# Patient Record
Sex: FEMALE
[De-identification: names, ages, dates, MRNs, and addresses within clinical notes are randomized per-mention and may not be internally consistent; named-entity substitution may affect disease eponyms.]

---

## 2022-09-14 ENCOUNTER — DASHBOARD ENCOUNTERS (OUTPATIENT)
Age: 32
End: 2022-09-14

## 2022-09-14 ENCOUNTER — LAB OUTSIDE AN ENCOUNTER (OUTPATIENT)
Dept: URBAN - METROPOLITAN AREA CLINIC 72 | Facility: CLINIC | Age: 32
End: 2022-09-14

## 2022-09-14 ENCOUNTER — WEB ENCOUNTER (OUTPATIENT)
Dept: URBAN - METROPOLITAN AREA CLINIC 72 | Facility: CLINIC | Age: 32
End: 2022-09-14

## 2022-09-14 ENCOUNTER — CLAIMS CREATED FROM THE CLAIM WINDOW (OUTPATIENT)
Dept: URBAN - METROPOLITAN AREA CLINIC 72 | Facility: CLINIC | Age: 32
End: 2022-09-14
Payer: MEDICAID

## 2022-09-14 VITALS
HEART RATE: 84 BPM | SYSTOLIC BLOOD PRESSURE: 129 MMHG | HEIGHT: 64 IN | BODY MASS INDEX: 25.61 KG/M2 | TEMPERATURE: 97.7 F | WEIGHT: 150 LBS | DIASTOLIC BLOOD PRESSURE: 87 MMHG

## 2022-09-14 DIAGNOSIS — K62.5 RECTAL BLEEDING: ICD-10-CM

## 2022-09-14 DIAGNOSIS — K64.8 INTERNAL HEMORRHOID: ICD-10-CM

## 2022-09-14 PROCEDURE — 99204 OFFICE O/P NEW MOD 45 MIN: CPT | Performed by: INTERNAL MEDICINE

## 2022-09-14 PROCEDURE — 46600 DIAGNOSTIC ANOSCOPY SPX: CPT | Performed by: INTERNAL MEDICINE

## 2022-09-14 RX ORDER — AMLODIPINE BESYLATE 10 MG/1
1 TABLET TABLET ORAL ONCE A DAY
Status: ACTIVE | COMMUNITY

## 2022-09-14 RX ORDER — HYDROCORTISONE ACETATE 25 MG/1
1 SUPPOSITORY SUPPOSITORY RECTAL ONCE A DAY
Qty: 30 | Refills: 1 | OUTPATIENT
Start: 2022-09-14 | End: 2022-11-12

## 2022-09-14 NOTE — HPI-TODAY'S VISIT:
Ms. Dee is a pleasant 32-year-old female presents as a new patient for evaluation for hemorrhoids.She reports that she has been experiencing rectal bleeding.  She has had hemorrhoids in the past and actually had a hemorrhoidectomy.  Since that time she did well but she did have another child and has not experienced more bleeding.  She is having spontaneous bleeding as well as blood with bowel movements.  She does endorse constipation.  She recently started Metamucil which has helped with her constipation some but has still had bleeding and discomfort.  She has never had a colonoscopy.  She has no family history of colon cancer.

## 2022-09-14 NOTE — EXAM-PHYSICAL EXAM
Rectal: Perianal exam with external skin tags/prior hemorrhoidal disease no obvious lesions otherwise, anoscopy with some brown stool in all blood products seen, large internal hemorrhoids and questionable wartlike lesions.  Digital rectal exam without mass. Medical assistant present as chaperone

## 2022-09-19 ENCOUNTER — OFFICE VISIT (OUTPATIENT)
Dept: URBAN - METROPOLITAN AREA MEDICAL CENTER 40 | Facility: MEDICAL CENTER | Age: 32
End: 2022-09-19
Payer: MEDICAID

## 2022-09-19 DIAGNOSIS — K62.5 ANAL BLEEDING: ICD-10-CM

## 2022-09-19 DIAGNOSIS — K57.30 ACQUIRED DIVERTICULOSIS OF COLON: ICD-10-CM

## 2022-09-19 DIAGNOSIS — K64.4 EXTERNAL BLEEDING HEMORRHOIDS: ICD-10-CM

## 2022-09-19 DIAGNOSIS — D12.4 ADENOMA OF DESCENDING COLON: ICD-10-CM

## 2022-09-19 PROCEDURE — 45380 COLONOSCOPY AND BIOPSY: CPT | Performed by: INTERNAL MEDICINE

## 2022-09-27 ENCOUNTER — TELEPHONE ENCOUNTER (OUTPATIENT)
Dept: URBAN - METROPOLITAN AREA CLINIC 72 | Facility: CLINIC | Age: 32
End: 2022-09-27

## 2022-09-27 PROBLEM — 398050005 DIVERTICULAR DISEASE OF COLON: Status: ACTIVE | Noted: 2022-09-27

## 2022-09-27 PROBLEM — 90458007 INTERNAL HEMORRHOID: Status: ACTIVE | Noted: 2022-09-27

## 2022-09-27 PROBLEM — 12063002 RECTAL BLEEDING: Status: ACTIVE | Noted: 2022-09-27

## 2022-10-19 ENCOUNTER — OFFICE VISIT (OUTPATIENT)
Dept: URBAN - METROPOLITAN AREA CLINIC 72 | Facility: CLINIC | Age: 32
End: 2022-10-19

## 2022-10-19 PROBLEM — 397881000: Status: ACTIVE | Noted: 2022-10-19

## 2022-10-19 RX ORDER — AMLODIPINE BESYLATE 10 MG/1
1 TABLET TABLET ORAL ONCE A DAY
Status: ACTIVE | COMMUNITY

## 2022-10-19 RX ORDER — HYDROCORTISONE ACETATE 25 MG/1
1 SUPPOSITORY SUPPOSITORY RECTAL ONCE A DAY
Qty: 30 | Refills: 1 | Status: ACTIVE | COMMUNITY
Start: 2022-09-14 | End: 2022-11-12

## 2022-10-19 NOTE — HPI-TODAY'S VISIT:
Mrs. Dee returns for follow-up, she is a 32-year-old last seen in our office on 9/14/2022 with a complaint of rectal bleeding.  Status post hemorrhoidectomy in the past.  She noted spontaneous bleeding as well as blood with bowel movements associated with constipation, responding to Metamucil incompletely but with some benefit.  She had never undergone colonoscopy check no family history of colon cancer.  We decided to treat her with suppositories and arrange for a colonoscopy given the significant bleeding, this was performed on 9/19/2022 completed to the cecum, ascending colon diverticulosis with inverted diverticulum widemouth diverticulum mild in nature noted in external hemorrhoids none thrombosed nonbleeding but severe.  2 descending colon polyps also removed.  Pathology showed the polyps to be adenomatous and a repeat colonoscopy was recommended in 5 years

## 2022-10-19 NOTE — EXAM-PHYSICAL EXAM
General--no acute distress, resting comfortably Eyes--anicteric, no pallor HENT--normocephalic, atraumatic head Neck--no lymphadenopathy, non tender Chest--normal breath sounds, equal rise Heart--regular rate and rhythm Abdomen--soft, non tender, non distended, bowel sounds present, no organomegaly